# Patient Record
Sex: MALE | Race: WHITE
[De-identification: names, ages, dates, MRNs, and addresses within clinical notes are randomized per-mention and may not be internally consistent; named-entity substitution may affect disease eponyms.]

---

## 2021-12-23 ENCOUNTER — HOSPITAL ENCOUNTER (OUTPATIENT)
Dept: HOSPITAL 79 - US | Age: 61
End: 2021-12-23
Attending: FAMILY MEDICINE
Payer: MEDICARE

## 2021-12-23 DIAGNOSIS — M25.561: Primary | ICD-10-CM

## 2022-01-19 ENCOUNTER — HOSPITAL ENCOUNTER (OUTPATIENT)
Dept: HOSPITAL 79 - EMI | Age: 62
End: 2022-01-19
Attending: FAMILY MEDICINE
Payer: MEDICARE

## 2022-01-19 DIAGNOSIS — M71.21: ICD-10-CM

## 2022-01-19 DIAGNOSIS — M23.321: ICD-10-CM

## 2022-01-19 DIAGNOSIS — M94.8X6: ICD-10-CM

## 2022-01-19 DIAGNOSIS — S83.511A: ICD-10-CM

## 2022-01-19 DIAGNOSIS — M65.9: ICD-10-CM

## 2022-01-19 DIAGNOSIS — M25.461: ICD-10-CM

## 2022-01-19 DIAGNOSIS — M79.9: Primary | ICD-10-CM

## 2022-01-19 PROCEDURE — A9577 INJ MULTIHANCE: HCPCS

## 2022-01-21 ENCOUNTER — HOSPITAL ENCOUNTER (OUTPATIENT)
Dept: HOSPITAL 79 - OPSV2 | Age: 62
End: 2022-01-21
Attending: ORTHOPAEDIC SURGERY
Payer: MEDICARE

## 2022-01-21 DIAGNOSIS — Z01.818: Primary | ICD-10-CM

## 2022-01-21 DIAGNOSIS — M17.11: ICD-10-CM

## 2022-01-21 LAB
BUN/CREATININE RATIO: 16 (ref 0–10)
HGB BLD-MCNC: 11.8 GM/DL (ref 14–17.5)
RED BLOOD COUNT: 3.74 M/UL (ref 4.2–5.5)
WHITE BLOOD COUNT: 11.8 K/UL (ref 4.5–11)

## 2023-10-26 ENCOUNTER — TELEPHONE (OUTPATIENT)
Dept: CARDIAC SURGERY | Facility: CLINIC | Age: 63
End: 2023-10-26

## 2024-02-13 ENCOUNTER — TELEPHONE (OUTPATIENT)
Dept: CARDIAC SURGERY | Facility: CLINIC | Age: 64
End: 2024-02-13
Payer: MEDICARE

## 2024-03-07 ENCOUNTER — TELEPHONE (OUTPATIENT)
Dept: CARDIAC SURGERY | Facility: CLINIC | Age: 64
End: 2024-03-07
Payer: MEDICARE

## 2024-03-07 NOTE — TELEPHONE ENCOUNTER
Called patient regarding his appt for 3/14/24 with Dr. Aden. Was not successful. Need to cancel appt for now due to Dr. Aden not being available.       Hub: if patient returns phone call to office let them s/w Luzma

## 2024-03-25 ENCOUNTER — TELEPHONE (OUTPATIENT)
Dept: CARDIAC SURGERY | Facility: CLINIC | Age: 64
End: 2024-03-25
Payer: MEDICARE

## 2024-03-25 NOTE — TELEPHONE ENCOUNTER
Patient was referred to our office by Dr. Holli Botello for a Vascular Consult to see a provider that is no longer with our practice. We have tried to contact patient with these appts and provider info but have been unsuccessful. I had to leave a voicemail for referring office regarding this and if patient could get a mor recent MARILYN

## 2024-04-02 ENCOUNTER — TELEPHONE (OUTPATIENT)
Dept: CARDIAC SURGERY | Facility: CLINIC | Age: 64
End: 2024-04-02
Payer: MEDICARE

## 2024-04-02 NOTE — TELEPHONE ENCOUNTER
TRIED TO [facility] TO R/S HIS LATEST MISSED APPT AND NOT SUCCESSFUL. PT ALREADY HAS 3 NS'S POSSIBLE DISMISSAL?